# Patient Record
Sex: FEMALE | Employment: PART TIME | URBAN - METROPOLITAN AREA
[De-identification: names, ages, dates, MRNs, and addresses within clinical notes are randomized per-mention and may not be internally consistent; named-entity substitution may affect disease eponyms.]

---

## 2021-05-31 ENCOUNTER — RECORDS - HEALTHEAST (OUTPATIENT)
Dept: ADMINISTRATIVE | Facility: CLINIC | Age: 36
End: 2021-05-31

## 2022-08-02 ENCOUNTER — OFFICE VISIT (OUTPATIENT)
Dept: FAMILY MEDICINE | Facility: CLINIC | Age: 37
End: 2022-08-02
Payer: COMMERCIAL

## 2022-08-02 ENCOUNTER — LAB (OUTPATIENT)
Dept: LAB | Facility: CLINIC | Age: 37
End: 2022-08-02

## 2022-08-02 VITALS
HEART RATE: 62 BPM | BODY MASS INDEX: 29.66 KG/M2 | DIASTOLIC BLOOD PRESSURE: 85 MMHG | HEIGHT: 65 IN | SYSTOLIC BLOOD PRESSURE: 129 MMHG | TEMPERATURE: 98.6 F | WEIGHT: 178 LBS | OXYGEN SATURATION: 99 %

## 2022-08-02 DIAGNOSIS — R21 RASH: Primary | ICD-10-CM

## 2022-08-02 DIAGNOSIS — G89.29 CHRONIC MIDLINE THORACIC BACK PAIN: ICD-10-CM

## 2022-08-02 DIAGNOSIS — N63.15 BREAST LUMP ON RIGHT SIDE AT 9 O'CLOCK POSITION: ICD-10-CM

## 2022-08-02 DIAGNOSIS — E03.9 HYPOTHYROIDISM, UNSPECIFIED TYPE: ICD-10-CM

## 2022-08-02 DIAGNOSIS — M54.6 CHRONIC MIDLINE THORACIC BACK PAIN: ICD-10-CM

## 2022-08-02 LAB
T4 FREE SERPL-MCNC: 1.03 NG/DL (ref 0.76–1.46)
TSH SERPL DL<=0.005 MIU/L-ACNC: 3.69 MU/L (ref 0.4–4)

## 2022-08-02 PROCEDURE — 84439 ASSAY OF FREE THYROXINE: CPT | Performed by: PATHOLOGY

## 2022-08-02 PROCEDURE — 36415 COLL VENOUS BLD VENIPUNCTURE: CPT | Performed by: PATHOLOGY

## 2022-08-02 PROCEDURE — 99203 OFFICE O/P NEW LOW 30 MIN: CPT | Performed by: NURSE PRACTITIONER

## 2022-08-02 PROCEDURE — 84443 ASSAY THYROID STIM HORMONE: CPT | Performed by: PATHOLOGY

## 2022-08-02 RX ORDER — CYCLOBENZAPRINE HCL 5 MG
5 TABLET ORAL
Qty: 30 TABLET | Refills: 0 | Status: SHIPPED | OUTPATIENT
Start: 2022-08-02

## 2022-08-02 RX ORDER — LEVOTHYROXINE SODIUM 112 UG/1
112 TABLET ORAL
COMMUNITY
Start: 2022-03-30

## 2022-08-02 RX ORDER — MEDROXYPROGESTERONE ACETATE 10 MG
TABLET ORAL
COMMUNITY
Start: 2022-06-20

## 2022-08-02 RX ORDER — PNV NO.95/FERROUS FUM/FOLIC AC 28MG-0.8MG
1 TABLET ORAL DAILY
COMMUNITY

## 2022-08-02 NOTE — PROGRESS NOTES
"  Assessment & Plan     Rash  - Adult Dermatology Referral  - possible keratosis pilaris but will refer to dermatology for further management        Chronic midline thoracic back pain  - cyclobenzaprine (FLEXERIL) 5 MG tablet  Dispense: 30 tablet; Refill: 0  - Orthopedic  Referral  - Physical Therapy Referral  - XR Thoracic Spine 2 Views    Hypothyroidism, unspecified type  - TSH  - T4 free    Breast lump on right side at 9 o'clock position  - US Breast Right Limited 1-3 Quadrants  - Mammogram, diagnostic      30 minutes spent on the date of the encounter doing chart review, history and exam, documentation and further activities per the note       BMI:   Estimated body mass index is 29.3 kg/m  as calculated from the following:    Height as of this encounter: 1.66 m (5' 5.35\").    Weight as of this encounter: 80.7 kg (178 lb).       See Patient Instructions  Referral to derm, TSH/free T4 today, and breast ultrasound   Thoracic spine xray today, referral to PT, light stretching up home, heat, flexeril as needed at night and referral to ortho.  Return to clinic if no improvement or symptoms worsen.  Patient verbalized understanding & agreed with plan of care.    MELODIE De Los Santos, CNP  [facility] Leung is a 36 year old accompanied by herself, presenting for the following health issues:  back pain , Derm Problem (Check lower leg ), and discuss lump in right breast       HPI   Patient with history of anxiety/depression and hypothyroidism.     1. Breast lump of the right side. Has felt over the last few months. No breast pain.  Declines nipple discharge.  Has never had anything like this before.   No family history of breast cancer.    2. Has rash concerns on both legs, from thighs down.  Has present for two months.  It can be itchy.  Tried hydrocortisone and Aquaphor.  Tried both for a week but did not note improvement.  No changes in laundry " "detergent, soaps.   Was using a tanning both and was using a tanning lotion - has used same brand of tanning lotion before and didn't have problem.  No fever.      3. Has had back pain for years.  Use to weight lift.  States last summer she was doing back squats and moved the same day - then had severe back pain the next day.  Ever since then the pain has gotten progressively worse - has good and bad days.  This past Friday was the most severe pain she has ever had - such severe pain - felt nauseated. Pain is located in the mid back.  Pain is constant.  Describes pain as throbbing this last Friday some days describes the pain as dull.  No radiation of pain.  No numbness or tingling in the leg.  Has had some neck stiffness.  No unintentional weight loss. Declines changes in bowl or bladder.  Takes ibuprofen daily 800mg daily.  Has tried 2 tablets of Tylenol up to twice a day.  Sleeps with a heating pad each tried.  Tried icy hot. Has had cyclobenzaprine - helpful in the past for sleep.  Has tried physical therapy in the past - did not go long enough to see the benefit?   Not exercising - too hard to.   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 Has not tried any ice.      4. History of hypothyroidism.  No concerns today - taking levothyroxine 112mcg.      5. History of menorrhagia with a regular cylce per previous providers note. She is to take start medroxyprogesterone (Provera) 10 mg daily for 10 days beginning on day 16 of menses.  She states she typically starts if her periods are heavy and last a long time.          Review of Systems   Constitutional, HEENT, cardiovascular, pulmonary, gi and gu systems are negative, except as otherwise noted.      Objective    /85   Pulse 62   Temp 98.6  F (37  C) (Oral)   Ht 1.66 m (5' 5.35\")   Wt 80.7 kg (178 lb)   LMP 08/02/2022   SpO2 99%   BMI 29.30 kg/m    Body mass index is 29.3 kg/m .  Physical Exam   GENERAL: healthy, alert and no " distress  EYES: Eyes grossly normal to inspection, PERRL and conjunctivae and sclerae normal  HENT: ear canals and TM's normal, nose and mouth without ulcers or lesions  NECK: no adenopathy, no asymmetry, masses, or scars and thyroid normal to palpation  RESP: lungs clear to auscultation - no rales, rhonchi or wheezes  BREAST: normal without masses, tenderness or nipple discharge and no palpable axillary masses or adenopathy  CV: regular rate and rhythm, normal S1 S2, no S3 or S4, no murmur, click or rub, no peripheral edema and peripheral pulses strong  MS: spine exam shows ROM is normal  SKIN: no suspicious lesions or rashes  NEURO: Normal strength and tone, mentation intact and speech normal  PSYCH: mentation appears normal, affect normal/bright    ..Imaging and lab work - pending

## 2022-08-09 ENCOUNTER — ANCILLARY PROCEDURE (OUTPATIENT)
Dept: MAMMOGRAPHY | Facility: CLINIC | Age: 37
End: 2022-08-09
Attending: NURSE PRACTITIONER
Payer: COMMERCIAL

## 2022-08-09 DIAGNOSIS — N63.15 BREAST LUMP ON RIGHT SIDE AT 9 O'CLOCK POSITION: ICD-10-CM

## 2022-08-09 PROCEDURE — 77066 DX MAMMO INCL CAD BI: CPT

## 2022-08-09 PROCEDURE — 76642 ULTRASOUND BREAST LIMITED: CPT | Mod: LT

## 2022-08-09 PROCEDURE — G0279 TOMOSYNTHESIS, MAMMO: HCPCS

## 2022-08-29 ENCOUNTER — THERAPY VISIT (OUTPATIENT)
Dept: PHYSICAL THERAPY | Facility: CLINIC | Age: 37
End: 2022-08-29
Attending: NURSE PRACTITIONER
Payer: COMMERCIAL

## 2022-08-29 DIAGNOSIS — G89.29 CHRONIC MIDLINE THORACIC BACK PAIN: ICD-10-CM

## 2022-08-29 DIAGNOSIS — M54.6 CHRONIC MIDLINE THORACIC BACK PAIN: ICD-10-CM

## 2022-08-29 PROCEDURE — 97530 THERAPEUTIC ACTIVITIES: CPT | Mod: GP | Performed by: PHYSICAL THERAPIST

## 2022-08-29 PROCEDURE — 97110 THERAPEUTIC EXERCISES: CPT | Mod: GP | Performed by: PHYSICAL THERAPIST

## 2022-08-29 PROCEDURE — 97161 PT EVAL LOW COMPLEX 20 MIN: CPT | Mod: GP | Performed by: PHYSICAL THERAPIST

## 2022-08-29 NOTE — PROGRESS NOTES
Physical Therapy Initial Evaluation  Subjective:  The history is provided by the patient. No  was used.   Patient Health History  Xochitl Moulton being seen for Back Pain .     Problem began: 8/9/2022 (MD order).   Problem occurred: Back Squatting   Pain is reported as 4/10 on pain scale.  General health as reported by patient is good.  Pertinent medical history includes: anemia, depression and thyroid problems.   Red flags:  None as reported by patient.  Medical allergies: none.   Surgeries include:  None.    Current medications:  Thyroid medication, pain medication and muscle relaxants.    Current occupation is Medical Assistant.   Primary job tasks include:  Computer work, lifting/carrying, prolonged sitting and pushing/pulling.                  Therapist Generated HPI Evaluation  Problem details: Been around for a very long time, feels that it has recently gotten a lot worse. Lifted more weight than usual and felt like that did it, did not have a specific injury. .         Type of problem:  Thoracic and lumbar.    This is a chronic condition.  Condition occurred with:  Insidious onset.  Where condition occurred: during recreation/sport.  Patient reports pain:  Upper lumbar spine and central thoracic spine.  Pain is described as aching and is constant.  Pain radiates to:  No radiation. Pain is worse during the night.  Since onset symptoms are gradually worsening.  Associated symptoms:  Loss of motion/stiffness. Symptoms are exacerbated by bending, lifting, sitting and standing  and relieved by rest, NSAID's and heat (Laying down helps but not for too long.).  Special tests included:  X-ray.  Previous treatment includes chiropractic, other and physical therapy (Temporary help, nothing has really seemed to help completely.). There was mild improvement following previous treatment.  Restrictions due to condition include:  Working in normal job without restrictions.  Barriers include:  None as  reported by patient.    Xochitl Moulton , : 1985, MRN: [id number]    Physical Therapy Objective Findings  Subjective information, goals, clinical impression, daily documentation and other information found in EPISODES tab.  Objective:     Lumbar Pain    Posture: Flat thoracic curve, very stiff upon palpation  Gait:  normal  Lumbar Range of Motion:  Flexion                                                     100                                                                                                                      Extension 80   Right Side Bending 100   Left Side Bending 80   Repeated extension- standing    Repeated flexion- standing      Hip Screen:                                                                  Positive                                             Negative                                             Hip ROM Normal Normal    Scour     ROSSRYLEE HUBER     Other:     Manual Muscle Testing (graded 0-5, measured at 0 degrees unless otherwise noted):                                                                              Right                                  Left                                                     Transversus Abdominus     -Carlton Leg Lowering (deg) 4 4   Hip Flex L2 5 5   Hip Abd     Hip Add     Hip Ext 5 5   Knee Flex 5 5   Knee Ext L3 5 5   Ankle Dorsiflexion L4 5 5   Great Toe Extension L5 5 5   Ankle Plantar Flexion S1 5 5   Other:     (+ mild pain, ++ moderate pain, +++ severe pain)    Special Tests:                                                                     Positive                                             Negative                                             Sign of Buttock     SLR  x   Lenny Test     Ely Test     Prone instability Test     Crossover SLR     Repeated extension prone     Other: BETH x 3:00 Improving mobility and decreasing pain with PA of T7-T12 afterwards (especially after 2nd set)      Flexibility:                                                               Right                                                 Left                                                      Hamstring Normal Slightly tight, but doesn't cause symptoms   Hip flexor Normal Tightness noted on palpation   Quadricep     Araceli's     piriformis     Other:            Segmental Mobility: Stiffness from T7-T12, pain relieved by prone on elbows. Significant decreases in tone and hypomobility after prone on elbows.    Palpation: Hypertonicity noted on paraspinals T7-T12, relieved by prone on elbows                  Objective:  System    Physical Exam    General     ROS    Assessment/Plan:    Patient is a 37 year old female with lumbar complaints.    Patient has the following significant findings with corresponding treatment plan.                Diagnosis 1:  Thoracic Spine pain/Disc Irritation    Pain -  hot/cold therapy, manual therapy, self management, education, directional preference exercise and home program  Decreased ROM/flexibility - manual therapy, therapeutic exercise and therapeutic activity  Decreased strength - therapeutic exercise, therapeutic activities and home program  Impaired muscle performance - biofeedback, neuro re-education and home program  Impaired posture - neuro re-education, therapeutic activities and home program    Therapy Evaluation Codes:   1) History comprised of:   Personal factors that impact the plan of care:      None.    Comorbidity factors that impact the plan of care are:      None.     Medications impacting care: Muscle relaxant and Pain.  2) Examination of Body Systems comprised of:   Body structures and functions that impact the plan of care:      Thoracic Spine.   Activity limitations that impact the plan of care are:      Bending, Lifting and Squatting/kneeling.  3) Clinical presentation characteristics are:   Stable/Uncomplicated.  4) Decision-Making    Low complexity using standardized patient assessment instrument and/or  measureable assessment of functional outcome.  Cumulative Therapy Evaluation is: Low complexity.    Previous and current functional limitations:  (See Goal Flow Sheet for this information)    Short term and Long term goals: (See Goal Flow Sheet for this information)     Communication ability:  Patient appears to be able to clearly communicate and understand verbal and written communication and follow directions correctly.  Treatment Explanation - The following has been discussed with the patient:   RX ordered/plan of care  Anticipated outcomes  Possible risks and side effects  This patient would benefit from PT intervention to resume normal activities.   Rehab potential is good.    Frequency:  1 X week, once daily  Duration:  for 10 weeks  Discharge Plan:  Achieve all LTG.  Independent in home treatment program.  Reach maximal therapeutic benefit.    Please refer to the daily flowsheet for treatment today, total treatment time and time spent performing 1:1 timed codes.     Hero Clinton, SPT  Daniel Lemus, PT, OCS

## 2022-09-12 ENCOUNTER — OFFICE VISIT (OUTPATIENT)
Dept: FAMILY MEDICINE | Facility: CLINIC | Age: 37
End: 2022-09-12
Payer: COMMERCIAL

## 2022-09-12 VITALS
DIASTOLIC BLOOD PRESSURE: 88 MMHG | HEIGHT: 65 IN | SYSTOLIC BLOOD PRESSURE: 130 MMHG | BODY MASS INDEX: 29.62 KG/M2 | HEART RATE: 68 BPM | RESPIRATION RATE: 20 BRPM | WEIGHT: 177.8 LBS

## 2022-09-12 DIAGNOSIS — R21 RASH: ICD-10-CM

## 2022-09-12 DIAGNOSIS — N60.02 BREAST CYST, LEFT: Primary | ICD-10-CM

## 2022-09-12 DIAGNOSIS — Z23 NEED FOR PROPHYLACTIC VACCINATION AND INOCULATION AGAINST INFLUENZA: ICD-10-CM

## 2022-09-12 LAB
BASOPHILS # BLD AUTO: 0 10E3/UL (ref 0–0.2)
BASOPHILS NFR BLD AUTO: 0 %
EOSINOPHIL # BLD AUTO: 0 10E3/UL (ref 0–0.7)
EOSINOPHIL NFR BLD AUTO: 0 %
ERYTHROCYTE [DISTWIDTH] IN BLOOD BY AUTOMATED COUNT: 15 % (ref 10–15)
HBA1C MFR BLD: 4.9 % (ref 0–5.6)
HCT VFR BLD AUTO: 33.4 % (ref 35–47)
HGB BLD-MCNC: 10.2 G/DL (ref 11.7–15.7)
IMM GRANULOCYTES # BLD: 0 10E3/UL
IMM GRANULOCYTES NFR BLD: 0 %
LYMPHOCYTES # BLD AUTO: 2 10E3/UL (ref 0.8–5.3)
LYMPHOCYTES NFR BLD AUTO: 21 %
MCH RBC QN AUTO: 23.9 PG (ref 26.5–33)
MCHC RBC AUTO-ENTMCNC: 30.5 G/DL (ref 31.5–36.5)
MCV RBC AUTO: 78 FL (ref 78–100)
MONOCYTES # BLD AUTO: 0.6 10E3/UL (ref 0–1.3)
MONOCYTES NFR BLD AUTO: 6 %
NEUTROPHILS # BLD AUTO: 6.8 10E3/UL (ref 1.6–8.3)
NEUTROPHILS NFR BLD AUTO: 72 %
PLATELET # BLD AUTO: 318 10E3/UL (ref 150–450)
RBC # BLD AUTO: 4.26 10E6/UL (ref 3.8–5.2)
WBC # BLD AUTO: 9.4 10E3/UL (ref 4–11)

## 2022-09-12 PROCEDURE — 90471 IMMUNIZATION ADMIN: CPT | Performed by: PHYSICIAN ASSISTANT

## 2022-09-12 PROCEDURE — 90686 IIV4 VACC NO PRSV 0.5 ML IM: CPT | Performed by: PHYSICIAN ASSISTANT

## 2022-09-12 PROCEDURE — 36415 COLL VENOUS BLD VENIPUNCTURE: CPT | Performed by: PHYSICIAN ASSISTANT

## 2022-09-12 PROCEDURE — 83036 HEMOGLOBIN GLYCOSYLATED A1C: CPT | Performed by: PHYSICIAN ASSISTANT

## 2022-09-12 PROCEDURE — 85025 COMPLETE CBC W/AUTO DIFF WBC: CPT | Performed by: PHYSICIAN ASSISTANT

## 2022-09-12 PROCEDURE — 99214 OFFICE O/P EST MOD 30 MIN: CPT | Mod: 25 | Performed by: PHYSICIAN ASSISTANT

## 2022-09-12 RX ORDER — CEPHALEXIN 500 MG/1
500 CAPSULE ORAL 3 TIMES DAILY
Qty: 30 CAPSULE | Refills: 0 | Status: SHIPPED | OUTPATIENT
Start: 2022-09-12 | End: 2022-09-21

## 2022-09-12 RX ORDER — TRIAMCINOLONE ACETONIDE 1 MG/G
CREAM TOPICAL 2 TIMES DAILY
Qty: 30 G | Refills: 0 | Status: SHIPPED | OUTPATIENT
Start: 2022-09-12

## 2022-09-12 ASSESSMENT — PAIN SCALES - GENERAL: PAINLEVEL: NO PAIN (0)

## 2022-09-12 NOTE — PATIENT INSTRUCTIONS
Known cyst left breast- 3.7 x 2.4 x 4.1 cm cyst  To breast surgeon.   Did send rx for cephalexin to the pharmacy.   Should be seen for urgent evaluation if: new fever (at or above 100.4), expanding redness from site, red streaking from site, significant increase in pain, increase in swelling, feeling sick/flu-like.     Axillary rash-  start triamcinolone topical steroid twice daily for 7-10 days. Stop if worsening. If rash clears sooner ok to discontinue.

## 2022-09-12 NOTE — PROGRESS NOTES
"  Assessment & Plan     Breast cyst, left  Left breast cyst on diagnostic imaging from 4 weeks ago: cyst measuring- 3.7 x 2.4 x 4.1 cm cyst.  Has been increasing in size and tenderness. Very subtle redness over the skin. No fevers or malaise. Will start keflex. Need evaluation w/breast surgeon. Order placed and TC team to assist in scheduling - [facility] or [place] [facility].   S/sx for urgent/ER eval - new fevers, worsening redness, lymphatic streaking, malaise.   - Breast Provider  Referral; Future  - CBC with platelets and differential; Future  - cephALEXin (KEFLEX) 500 MG capsule; Take 1 capsule (500 mg) by mouth 3 times daily  - CBC with platelets and differential    Rash  Axillary rash- d/dx candida , contact dermatitis, acanthosis nigricans.   Prior treatment w/antifungals did not resolve  Normal a1c  Treat w/topical steroid. If worsening discontinue.   - triamcinolone (KENALOG) 0.1 % external cream; Apply topically 2 times daily  - Hemoglobin A1c; Future  - Hemoglobin A1c    Need for prophylactic vaccination and inoculation against influenza  given   - INFLUENZA VACCINE IM > 6 MONTHS VALENT IIV4 (AFLURIA/FLUZONE)       BMI: Estimated body mass index is 29.27 kg/m  as calculated from the following:    Height as of this encounter: 1.66 m (5' 5.35\").    Weight as of this encounter: 80.6 kg (177 lb 12.8 oz).   Weight management plan: Patient was referred to their PCP to discuss a diet and exercise plan.    Follow Up: The patient was instructed to contact clinic for worsening symptoms, non-improvement in time frame as expected/discussed, and for questions regarding medications or treatment plan. For virtual visits, the patient was advised to be seen for in person evaluation if symptoms or condition are worsening or non-improvement as expected.       No follow-ups on file.    Sarah Baca PA-C  [facility] JINNY Leung is a 37 year old, presenting for the following health " issues:  Breast Mass      History of Present Illness       Reason for visit:  Breast lump, rash under arms red and itchy   Symptom onset:  More than a month  Symptoms include:  Reddness  Symptom intensity:  Mild  Symptom progression:  Worsening    She eats 0-1 servings of fruits and vegetables daily.She consumes 0 sweetened beverage(s) daily.She exercises with enough effort to increase her heart rate 9 or less minutes per day.  She exercises with enough effort to increase her heart rate 3 or less days per week.   She is taking medications regularly.     Left breast lump   Saw PCP for bilateral breast lumps OV 08/02/2022. Had mammogram and US 08/09/2022 showing cysts bilaterally. Since then the left breast lump has slowly gotten bigger. In the last week and has had more pain for past 3 days and some redness.   No nipple discharge. No fevers. No body aches, malaise.   Not breastfeeding  No piercings.     Study Result    Narrative & Impression   Examinations: US BREAST BILATERAL LIMITED 1-3 QUADRANTS, [facility] DI[facility]NOSTIC  BILATERAL W/ CLARY, 8/9/2022 11:07 AM     Comparisons: None available     History: Bilateral palpable lumps     Technique: Tomosynthesis      BREAST DENSITY: Heterogeneously dense.     Findings:     Mammogram:  In the right breast, there is a circumscribed, partly obscured mass at  9:00, mid depth measuring approximately 3.4 x 2.9 cm. There are  multiple additional oval, circumscribed masses throughout the right  breast.  In the left breast there is a circumscribed, partly obscured oval mass  at 3:00 measuring approximately 3 x 3.7 cm.     Ultrasound:  Targeted ultrasound evaluation was performed by the technologist and  radiologist. In the right breast at 9:00, 4 cm from the nipple in the  region of palpable concern there is a 3.1 x 3.0 x 2.3 cm cyst. There  are additional smaller cysts adjacent to the dominant palpable right  mass. In the left breast at 3:00, 4 cm from the nipple in the region  of palpable  "concern there is a 3.7 x 2.4 x 4.1 cm cyst.                                                                      IMPRESSION: BI-RADS CATEGORY: 2 - Benign.     RECOMMENDED FOLLOW-UP: Clinical follow-up.     The finding and recommendation were discussed with the patient at the  time of evaluation.     The patient was given the results of the examination.     I have personally reviewed the examination and initial interpretation  and I agree with the findings.     DL MAYO MD        Axillary rash   Pt reports rash- itchy and a darkening of skin in axilla bilaterally for a few months. Has had new detergents/topicals.   Tried OTC antifungal for a while without improvement, actually caused burning.   Reported to PCP who referred to derm  Is using aquaphor.   Not tried a steroid.  No hx of GDM or prediabetes.       Review of Systems   Constitutional, HEENT, cardiovascular, pulmonary, gi and gu systems are negative, except as otherwise noted.      Objective    /88   Pulse 68   Resp 20   Ht 1.66 m (5' 5.35\")   Wt 80.6 kg (177 lb 12.8 oz)   LMP 08/29/2022   Breastfeeding No   BMI 29.27 kg/m    Body mass index is 29.27 kg/m .  Physical Exam   GENERAL: healthy, alert and no distress  NECK: no adenopathy, no asymmetry, masses, or scars and thyroid normal to palpation  BREAST: LEFT: 3-4cm palpable mass at 3:00 position. Possible subtle overlying erythema. No lymphatic streaking. No nipple discharge. No axillary masses.  RIGHT: palpable mass at 9:00 position, no terness or nipple discharge and no palpable axillary masses or adenopathy  MS: no gross musculoskeletal defects noted, no edema  SKIN: well defined patch of erythema and hyperpigmentation of axilla bilaterally.   NEURO: Normal strength and tone, mentation intact and speech normal  PSYCH: mentation appears normal, affect normal/bright    Results for orders placed or performed in visit on 09/12/22   Hemoglobin A1c     Status: Normal   Result Value " Ref Range    Hemoglobin A1C 4.9 0.0 - 5.6 %   CBC with platelets and differential     Status: Abnormal   Result Value Ref Range    WBC Count 9.4 4.0 - 11.0 10e3/uL    RBC Count 4.26 3.80 - 5.20 10e6/uL    Hemoglobin 10.2 (L) 11.7 - 15.7 g/dL    Hematocrit 33.4 (L) 35.0 - 47.0 %    MCV 78 78 - 100 fL    MCH 23.9 (L) 26.5 - 33.0 pg    MCHC 30.5 (L) 31.5 - 36.5 g/dL    RDW 15.0 10.0 - 15.0 %    Platelet Count 318 150 - 450 10e3/uL    % Neutrophils 72 %    % Lymphocytes 21 %    % Monocytes 6 %    % Eosinophils 0 %    % Basophils 0 %    % Immature Granulocytes 0 %    Absolute Neutrophils 6.8 1.6 - 8.3 10e3/uL    Absolute Lymphocytes 2.0 0.8 - 5.3 10e3/uL    Absolute Monocytes 0.6 0.0 - 1.3 10e3/uL    Absolute Eosinophils 0.0 0.0 - 0.7 10e3/uL    Absolute Basophils 0.0 0.0 - 0.2 10e3/uL    Absolute Immature Granulocytes 0.0 <=0.4 10e3/uL   CBC with platelets and differential     Status: Abnormal    Narrative    The following orders were created for panel order CBC with platelets and differential.  Procedure                               Abnormality         Status                     ---------                               -----------         ------                     CBC with platelets and d...[[id number]]  Abnormal            Final result                 Please view results for these tests on the individual orders.

## 2022-09-15 ENCOUNTER — PATIENT OUTREACH (OUTPATIENT)
Dept: ONCOLOGY | Facility: CLINIC | Age: 37
End: 2022-09-15

## 2022-09-15 NOTE — PROGRESS NOTES
New Patient Oncology Nurse Navigator Note     Referring provider: Sarah Baca PA-C    Referring Clinic/Organization: [facility] [place]     Referred to (specialty:) Breast Provider Referral      Date Referral Received: September 12, 2022 (writer became aware of this on 9/15 -- [employer])     Evaluation for:  Benign breast condition     Clinical History (per Nurse review of records provided):       Xochitl is a 37 year old female who presented with a right breast lump at 9 o'clock position on 8/2/22 to PCP/ordering provider.  Bilateral diagnostic mammogram and ultrasounds were performed on 8/9. On mammogram, in the right breast, there is a circumscribed, partly obscured mass at 9:00, mid depth measuring approximately 3.4 x 2.9 cm. There are multiple additional oval, circumscribed masses throughout the right breast. In the left breast there is a circumscribed, partly obscured oval mass  at 3:00 measuring approximately 3 x 3.7 cm. On targeted ultrasound evaluation in the right breast at 9:00, 4 cm from the nipple in the region of palpable concern there is a 3.1 x 3.0 x 2.3 cm cyst. There are additional smaller cysts adjacent to the dominant palpable right mass. In the left breast at 3:00, 4 cm from the nipple in the region of palpable concern there is a 3.7 x 2.4 x 4.1 cm cyst.  These areas were thought to be benign and BI-RADS CATEGORY: 2 - Benign.  Clinical follow up was recommended.      9/12/22 - Ordering provider assessed left breast and documented this has been increasing in size and tenderness. Very subtle redness over the skin. No fevers or malaise. Will start keflex. Need evaluation w/breast surgeon.    - cephALEXin (KEFLEX) 500 MG capsule; Take 1 capsule (500 mg) by mouth 3 times daily     Records Location: See Bookmarked material     Records Needed: All breast imaging for past 5 years    Resides in [place], [place]    Writer received referral, reviewed for  appropriate plan, and sent to New Patient Scheduling for completion.

## 2022-09-20 NOTE — PROGRESS NOTES
NEW CONSULTATION   Sep 21, 2022     Xochitl Moulton is a 37 year old woman who presents with left breast complaint. She was referred by Dr. Baca.    HPI:    She reports noticing a lump/mass in her right breast about 4-5 months ago; it was not painful. She then noticed lump/mass in her left breast about 2-3 months ago which was tender at times. She did not take any Ibuprofen or Tylenol but did use a warm shower for comfort. She saw her PCP again on 9/12 for redness on her left breast and feeling that there was an increase in size of the lump/mass; she was started on Keflex which she has since completed. She notes that the very slight redness and size of the mass improved the next day; now left breast is similar size to when she had her imaging done. She reports having pretty regular/normal periods and is not currently sexually active. .      BREAST-SPECIFIC HISTORY:    Previous breast imaging: Yes    8/9/22:     Mammogram:  In the right breast, there is a circumscribed, partly obscured mass at  9:00, mid depth measuring approximately 3.4 x 2.9 cm. There are  multiple additional oval, circumscribed masses throughout the right  breast.  In the left breast there is a circumscribed, partly obscured oval mass  at 3:00 measuring approximately 3 x 3.7 cm.     Ultrasound:  Targeted ultrasound evaluation was performed by the technologist and  radiologist. In the right breast at 9:00, 4 cm from the nipple in the  region of palpable concern there is a 3.1 x 3.0 x 2.3 cm cyst. There  are additional smaller cysts adjacent to the dominant palpable right  mass. In the left breast at 3:00, 4 cm from the nipple in the region  of palpable concern there is a 3.7 x 2.4 x 4.1 cm cyst.                                                                      IMPRESSION: BI-RADS CATEGORY: 2 - Benign.     RECOMMENDED FOLLOW-UP: Clinical follow-up.      Prior breast biopsies/surgeries: No    Prior history of breast cancer or DCIS: No  Prior  radiation history: No (years treated)  Self breast exams: No  Breast density: Heterogeneously dense.    GYN HISTORY:  . Age at 1st pregnancy: 20  Breastfeeding history: No.   Has had on miscarriage and one ; one living child  Age at menarche: 14    RISK ASSESSMENT: <3% 5 year risk and < 20% lifetime risk   Carmel:0.2% 5 year risk; 7.5% lifetime risk    FAMILY HISTORY:  Breast ca: No  Ovarian ca: No  Pancreatic ca: No  Prostate: No  Gastric ca: No  Melanoma: No  Sarcoma: no  Leukemia: no  Brain tumor: no  Adrenocortical carcinoma: no  Endometrial cancer: no  Thyroid cancer: no  Kidney cancer: no  Colon ca: No  Other cancer: No  Ashkenazi Quaker ethnicity: no  Other genetic, testing, syndromes, or clotting disorders: no    PAST MEDICAL HISTORY  Past Medical History:   Diagnosis Date     Anxiety      Depressive disorder      Other specified hypothyroidism      BMI: 28.61      PAST SURGICAL HISTORY   Past Surgical History:   Procedure Laterality Date     AS DILATION/CURETTAGE DIAG/THER NON OB         MEDICATIONS  Current Outpatient Medications   Medication Sig Dispense Refill     cyclobenzaprine (FLEXERIL) 5 MG tablet Take 1 tablet (5 mg) by mouth nightly as needed for muscle spasms 30 tablet 0     Ferrous Sulfate (IRON) 325 (65 Fe) MG tablet Take 1 tablet by mouth daily       levothyroxine (SYNTHROID/LEVOTHROID) 112 MCG tablet Take 112 mcg by mouth       medroxyPROGESTERone (PROVERA) 10 MG tablet TAKE 1 TABLET BY MOUTH EVERY DAY FOR 10 DAYS STARTING ON DAY 16 OF YOUR CYCLE       triamcinolone (KENALOG) 0.1 % external cream Apply topically 2 times daily 30 g 0         ALLERGIES     Allergies   Allergen Reactions     Hydrocodone-Acetaminophen Other (See Comments)     Other reaction(s): Syncope       Citalopram Unknown     Per patient she took medication in high school and it made her feel funny and awful. No allergic reaction, rash, throat swelling or closing or anaphylaxis.      Hydrocodone Dizziness      "Patient states that she fainted.        SOCIAL HISTORY:  Smokes: No  EtOH: Yes; socially/ 2 x mos  Caffeine intake: every day; medium coffee drink [employer]bucks; no soda  Exercise: not lately      ROS:  Change in vision No  Headaches: no  Respiratory: No shortness of breath, dyspnea on exertion, cough, or hemoptysis   Cardiovascular: negative   Gastrointestinal: negative Abdominal pain: no  Breast: right breast mass without pain; left breast mass with intermittent discomfort  Vaginal bleeding: no  Musculoskeletal: negative Joint pain No Back pain: chronic; injured after back squat last summer  Psychiatric: negative  Hematologic/Lymphatic/Immunologic: negative  Endocrine: negative    EXAM  BP (!) 135/90   Pulse 75   Temp 98.7  F (37.1  C) (Oral)   Resp 16   Ht 1.68 m (5' 6.14\")   Wt 80.7 kg (178 lb)   LMP 08/29/2022   SpO2 99%   BMI 28.61 kg/m     PHYSICAL EXAM  Respiratory: breathing non labored.   Breasts: Examination was done in both the upright and supine positions.  Breasts are symmetrical . Right breast with round, mobile, non-tender mass at 9 o:clock ~3-4 cm; left breast with round, mobile, non-tender mass at 3 o'clock ~ 4 cm.. smaller cysts palpable in both breasts. No skin or nipple changes. No nipple discharge.  No clavicular, cervical, or axillary lymphadenopathy.     INVESTIGATIONS:    9/21/22: US bilateral FINDINGS: Targeted ultrasound examination of the bilateral breasts was  performed by the technologist and radiologist.     In the right breast at the 9:00 position 4 cm from nipple there is a  complicated cyst containing mobile debris, currently measuring 3.0 x  2.2 x 3.1 cm, which is decreased in size from sonographic exam dated  8/9/2022.  In the left breast at the 3:00 position 4 cm from nipple there is a  complicated cyst containing mobile debris, currently measuring 3.2 x  2.4 x 3.9 cm, which is decreased in size from sonographic exam dated  8/9/2022. No suspicious finding in either " breast.                                                                      IMPRESSION: BI-RADS CATEGORY: 2 - Benign.     RECOMMENDED FOLLOW-UP: Annual Mammography Beginning at Age 40.    ASSESSMENT/PLAN:    Xochitl Moulton is a 37 year old woman who presents with a left breast complaint. She was referred by Dr. Baca.    1) Reviewed images today with the Radiologist and results were discussed with the patient.   - No concerning findings. Patient to continue to monitor cysts and if they become uncomfortable/larger she can contact the clinic regarding having them drained; discussed that she may re-accumulate the fluid again.     2) Be familiar with your breast and how they normally feel and appear. Promptly report any changes to your healthcare provider. Beginning screening mammograms at age 40.     Continue yearly clinical encounter. Be familiar with your breast and how they normally feel and appear. Promptly report any changes to your healthcare provider.     3) Lifestyle Modifications were provided.   - Maintain a healthy weight (BMI 20-25). Higher body mass index (BMI) and adult weight gain is associated with increased risk for breast cancer. This increase in risk has been attributed to increase in circulating endogenous estrogen levels from fat tissue.   - Alcohol consumption, even at moderate levels (1-2 drinks per day), increases breast cancer risk and are best avoided. If you choose to drink alcohol limit alcohol consumption to less than 1 drink per day. (1 ounce of liquor, 6 ounces of wine, or 8 ounces of beer)  - Be active daily and void being sedentary. Take part in at least 150-300 minutes of moderate-intensity physical activity per week.       47 minutes spent on the date of the encounter doing chart review, history and exam, documentation and further activities as noted above       MELODIE Ling, WHNP-BC, ANP-BC  Women's Health Nurse Practitioner  Adult Nurse Practitioner  Division of  Gynecologic Oncology

## 2022-09-21 ENCOUNTER — ONCOLOGY VISIT (OUTPATIENT)
Dept: ONCOLOGY | Facility: CLINIC | Age: 37
End: 2022-09-21
Attending: PHYSICIAN ASSISTANT
Payer: COMMERCIAL

## 2022-09-21 ENCOUNTER — ANCILLARY PROCEDURE (OUTPATIENT)
Dept: MAMMOGRAPHY | Facility: CLINIC | Age: 37
End: 2022-09-21
Attending: NURSE PRACTITIONER
Payer: COMMERCIAL

## 2022-09-21 VITALS
TEMPERATURE: 98.7 F | OXYGEN SATURATION: 99 % | BODY MASS INDEX: 28.61 KG/M2 | RESPIRATION RATE: 16 BRPM | HEIGHT: 66 IN | DIASTOLIC BLOOD PRESSURE: 90 MMHG | HEART RATE: 75 BPM | SYSTOLIC BLOOD PRESSURE: 135 MMHG | WEIGHT: 178 LBS

## 2022-09-21 DIAGNOSIS — N60.01 BREAST CYST, RIGHT: ICD-10-CM

## 2022-09-21 DIAGNOSIS — N63.20 LEFT BREAST MASS: ICD-10-CM

## 2022-09-21 DIAGNOSIS — N60.02 BREAST CYST, LEFT: Primary | ICD-10-CM

## 2022-09-21 PROCEDURE — 76642 ULTRASOUND BREAST LIMITED: CPT | Mod: GC | Performed by: RADIOLOGY

## 2022-09-21 PROCEDURE — G0463 HOSPITAL OUTPT CLINIC VISIT: HCPCS

## 2022-09-21 PROCEDURE — 99204 OFFICE O/P NEW MOD 45 MIN: CPT | Performed by: NURSE PRACTITIONER

## 2022-09-21 ASSESSMENT — PAIN SCALES - GENERAL: PAINLEVEL: EXTREME PAIN (8)

## 2022-09-21 NOTE — NURSING NOTE
"Oncology Rooming Note    September 21, 2022 12:11 PM   Xochitl Moulton is a 37 year old female who presents for:    Chief Complaint   Patient presents with     Oncology Clinic Visit     Breast cyst     Initial Vitals: BP (!) 135/90   Pulse 75   Temp 98.7  F (37.1  C) (Oral)   Resp 16   Ht 1.68 m (5' 6.14\")   Wt 80.7 kg (178 lb)   LMP 08/29/2022   SpO2 99%   BMI 28.61 kg/m   Estimated body mass index is 28.61 kg/m  as calculated from the following:    Height as of this encounter: 1.68 m (5' 6.14\").    Weight as of this encounter: 80.7 kg (178 lb). Body surface area is 1.94 meters squared.  Extreme Pain (8) Comment: Data Unavailable   Patient's last menstrual period was 08/29/2022.  Allergies reviewed: Yes  Medications reviewed: Yes    Medications: Medication refills not needed today.  Pharmacy name entered into [facility]: [id number] - [place], [place] - [address] [place] [address] AT [address]    Clinical concerns: none       Lissette Sanabria CMA            "

## 2022-09-21 NOTE — LETTER
9/21/2022         RE: Xochitl Moulton  [address] [facility] [address]        Dear Colleague,    Thank you for referring your patient, Xochitl Moulton, to the [facility]. Please see a copy of my visit note below.    NEW CONSULTATION   Sep 21, 2022     Xochitl Moulton is a 37 year old woman who presents with left breast complaint. She was referred by Dr. Baca.    HPI:    She reports noticing a lump/mass in her right breast about 4-5 months ago; it was not painful. She then noticed lump/mass in her left breast about 2-3 months ago which was tender at times. She did not take any Ibuprofen or Tylenol but did use a warm shower for comfort. She saw her PCP again on 9/12 for redness on her left breast and feeling that there was an increase in size of the lump/mass; she was started on Keflex which she has since completed. She notes that the very slight redness and size of the mass improved the next day; now left breast is similar size to when she had her imaging done. She reports having pretty regular/normal periods and is not currently sexually active. .      BREAST-SPECIFIC HISTORY:    Previous breast imaging: Yes    8/9/22:     Mammogram:  In the right breast, there is a circumscribed, partly obscured mass at  9:00, mid depth measuring approximately 3.4 x 2.9 cm. There are  multiple additional oval, circumscribed masses throughout the right  breast.  In the left breast there is a circumscribed, partly obscured oval mass  at 3:00 measuring approximately 3 x 3.7 cm.     Ultrasound:  Targeted ultrasound evaluation was performed by the technologist and  radiologist. In the right breast at 9:00, 4 cm from the nipple in the  region of palpable concern there is a 3.1 x 3.0 x 2.3 cm cyst. There  are additional smaller cysts adjacent to the dominant palpable right  mass. In the left breast at 3:00, 4 cm from the nipple in the region  of palpable concern there is a 3.7 x 2.4 x 4.1 cm  cyst.                                                                      IMPRESSION: BI-RADS CATEGORY: 2 - Benign.     RECOMMENDED FOLLOW-UP: Clinical follow-up.      Prior breast biopsies/surgeries: No    Prior history of breast cancer or DCIS: No  Prior radiation history: No (years treated)  Self breast exams: No  Breast density: Heterogeneously dense.    GYN HISTORY:  . Age at 1st pregnancy: 20  Breastfeeding history: No.   Has had on miscarriage and one ; one living child  Age at menarche: 14    RISK ASSESSMENT: <3% 5 year risk and < 20% lifetime risk   Carmel:0.2% 5 year risk; 7.5% lifetime risk    FAMILY HISTORY:  Breast ca: No  Ovarian ca: No  Pancreatic ca: No  Prostate: No  Gastric ca: No  Melanoma: No  Sarcoma: no  Leukemia: no  Brain tumor: no  Adrenocortical carcinoma: no  Endometrial cancer: no  Thyroid cancer: no  Kidney cancer: no  Colon ca: No  Other cancer: No  Ashkenazi Oriental orthodox ethnicity: no  Other genetic, testing, syndromes, or clotting disorders: no    PAST MEDICAL HISTORY  Past Medical History:   Diagnosis Date     Anxiety      Depressive disorder      Other specified hypothyroidism      BMI: 28.61      PAST SURGICAL HISTORY   Past Surgical History:   Procedure Laterality Date     AS DILATION/CURETTAGE DIAG/THER NON OB         MEDICATIONS  Current Outpatient Medications   Medication Sig Dispense Refill     cyclobenzaprine (FLEXERIL) 5 MG tablet Take 1 tablet (5 mg) by mouth nightly as needed for muscle spasms 30 tablet 0     Ferrous Sulfate (IRON) 325 (65 Fe) MG tablet Take 1 tablet by mouth daily       levothyroxine (SYNTHROID/LEVOTHROID) 112 MCG tablet Take 112 mcg by mouth       medroxyPROGESTERone (PROVERA) 10 MG tablet TAKE 1 TABLET BY MOUTH EVERY DAY FOR 10 DAYS STARTING ON DAY 16 OF YOUR CYCLE       triamcinolone (KENALOG) 0.1 % external cream Apply topically 2 times daily 30 g 0         ALLERGIES     Allergies   Allergen Reactions     Hydrocodone-Acetaminophen Other (See  "Comments)     Other reaction(s): Syncope       Citalopram Unknown     Per patient she took medication in high school and it made her feel funny and awful. No allergic reaction, rash, throat swelling or closing or anaphylaxis.      Hydrocodone Dizziness     Patient states that she fainted.        SOCIAL HISTORY:  Smokes: No  EtOH: Yes; socially/ 2 x mos  Caffeine intake: every day; medium coffee drink [employer]; no soda  Exercise: not lately      ROS:  Change in vision No  Headaches: no  Respiratory: No shortness of breath, dyspnea on exertion, cough, or hemoptysis   Cardiovascular: negative   Gastrointestinal: negative Abdominal pain: no  Breast: right breast mass without pain; left breast mass with intermittent discomfort  Vaginal bleeding: no  Musculoskeletal: negative Joint pain No Back pain: chronic; injured after back squat last summer  Psychiatric: negative  Hematologic/Lymphatic/Immunologic: negative  Endocrine: negative    EXAM  BP (!) 135/90   Pulse 75   Temp 98.7  F (37.1  C) (Oral)   Resp 16   Ht 1.68 m (5' 6.14\")   Wt 80.7 kg (178 lb)   LMP 08/29/2022   SpO2 99%   BMI 28.61 kg/m     PHYSICAL EXAM  Respiratory: breathing non labored.   Breasts: Examination was done in both the upright and supine positions.  Breasts are symmetrical . Right breast with round, mobile, non-tender mass at 9 o:clock ~3-4 cm; left breast with round, mobile, non-tender mass at 3 o'clock ~ 4 cm.. smaller cysts palpable in both breasts. No skin or nipple changes. No nipple discharge.  No clavicular, cervical, or axillary lymphadenopathy.     INVESTIGATIONS:    9/21/22: US bilateral FINDINGS: Targeted ultrasound examination of the bilateral breasts was  performed by the technologist and radiologist.     In the right breast at the 9:00 position 4 cm from nipple there is a  complicated cyst containing mobile debris, currently measuring 3.0 x  2.2 x 3.1 cm, which is decreased in size from sonographic exam dated  8/9/2022.  In " the left breast at the 3:00 position 4 cm from nipple there is a  complicated cyst containing mobile debris, currently measuring 3.2 x  2.4 x 3.9 cm, which is decreased in size from sonographic exam dated  8/9/2022. No suspicious finding in either breast.                                                                      IMPRESSION: BI-RADS CATEGORY: 2 - Benign.     RECOMMENDED FOLLOW-UP: Annual Mammography Beginning at Age 40.    ASSESSMENT/PLAN:    Xcohitl Moulton is a 37 year old woman who presents with a left breast complaint. She was referred by Dr. Baca.    1) Reviewed images today with the Radiologist and results were discussed with the patient.   - No concerning findings. Patient to continue to monitor cysts and if they become uncomfortable/larger she can contact the clinic regarding having them drained; discussed that she may re-accumulate the fluid again.     2) Be familiar with your breast and how they normally feel and appear. Promptly report any changes to your healthcare provider. Beginning screening mammograms at age 40.     Continue yearly clinical encounter. Be familiar with your breast and how they normally feel and appear. Promptly report any changes to your healthcare provider.     3) Lifestyle Modifications were provided.   - Maintain a healthy weight (BMI 20-25). Higher body mass index (BMI) and adult weight gain is associated with increased risk for breast cancer. This increase in risk has been attributed to increase in circulating endogenous estrogen levels from fat tissue.   - Alcohol consumption, even at moderate levels (1-2 drinks per day), increases breast cancer risk and are best avoided. If you choose to drink alcohol limit alcohol consumption to less than 1 drink per day. (1 ounce of liquor, 6 ounces of wine, or 8 ounces of beer)  - Be active daily and void being sedentary. Take part in at least 150-300 minutes of moderate-intensity physical activity per week.       47 minutes  spent on the date of the encounter doing chart review, history and exam, documentation and further activities as noted above       MELODIE Ling, WHNP-BC, ANP-BC  Women's Health Nurse Practitioner  Adult Nurse Practitioner  Division of Gynecologic Oncology

## 2022-09-25 ENCOUNTER — HEALTH MAINTENANCE LETTER (OUTPATIENT)
Age: 37
End: 2022-09-25

## 2022-10-11 PROBLEM — M54.6 CHRONIC MIDLINE THORACIC BACK PAIN: Status: RESOLVED | Noted: 2022-08-29 | Resolved: 2022-10-11

## 2022-10-11 PROBLEM — G89.29 CHRONIC MIDLINE THORACIC BACK PAIN: Status: RESOLVED | Noted: 2022-08-29 | Resolved: 2022-10-11

## 2022-10-11 NOTE — PROGRESS NOTES
Patient seen for one time evaluation and treatment.  Patient did not return for further treatment and current status is unknown.  Please see initial evaluation for further information.    Daniel Lemus,PT, DPT, OCS

## 2024-03-02 ENCOUNTER — HEALTH MAINTENANCE LETTER (OUTPATIENT)
Age: 39
End: 2024-03-02

## 2025-03-15 ENCOUNTER — HEALTH MAINTENANCE LETTER (OUTPATIENT)
Age: 40
End: 2025-03-15